# Patient Record
(demographics unavailable — no encounter records)

---

## 2024-11-25 NOTE — PHYSICAL EXAM
[Normal Heart Sounds] : normal heart sounds [2+] : left 2+ [Ankle Swelling Bilaterally] : bilaterally  [Ankle Swelling On The Right] : mild [Varicose Veins Of Lower Extremities] : bilaterally [Ankle Swelling On The Left] : moderate [JVD] : no jugular venous distention  [] : not present [Abdomen Masses] : No abdominal masses [Skin Ulcer] : no ulcer

## 2024-11-25 NOTE — ASSESSMENT
[FreeTextEntry1] : Patient complaining of right lower extremity discomfort.  No evidence of significant arterial insufficiency.  Patient has severe venous insufficiency involving the right lesser saphenous vein.  The vein is extremely tortuous and not a good candidate for endovenous ablation.  Patient will require stab phlebectomy of the varicosities and small saphenous vein.  This was all discussed with the patient.  At this point recommend compression stockings and elevation.  Follow-up as needed.

## 2025-04-29 NOTE — HISTORY OF PRESENT ILLNESS
[FreeTextEntry1] : PMH:  PSH:   Daily fluid intake: coffee x _ cup, decaf coffee x _ cup, water x _ cups, seltzer x _ cup

## 2025-04-29 NOTE — PROCEDURE
[FreeTextEntry1] :  A catheterized urine was performed to rule out urinary tract infection and/or retention. Post-void residual volume was ***

## 2025-04-29 NOTE — PHYSICAL EXAM
[FreeTextEntry1] : General: Well, appearing. Alert and orientated. No acute distress HEENT: Normocephalic, atraumatic and extraocular muscles appear to be intact Neck: Full range of motion, no obvious lymphadenopathy, deformities, or masses noted Respiratory: Speaking in full sentences comfortably, normal work of breathing and no cough during visit Musculoskeletal: active full range of motion in extremities Extremities: No upper extremity edema noted Skin: no obvious rash or skin lesions Neuro: Orientated X 3, speech is fluent, normal rate Psych: Normal mood and affect.

## 2025-06-04 NOTE — PHYSICAL EXAM
[Chaperoned Physical Exam] : A chaperone was present in the examining room during all aspects of the physical examination. [Labia Majora] : were normal [Labia Minora] : were normal [Normal Appearance] : general appearance was normal [Normal] : no abnormalities [FreeTextEntry1] : General: Well, appearing. Alert and orientated. No acute distress HEENT: Normocephalic, atraumatic and extraocular muscles appear to be intact Neck: Full range of motion, no obvious lymphadenopathy, deformities, or masses noted Respiratory: Speaking in full sentences comfortably, normal work of breathing and no cough during visit Musculoskeletal: active full range of motion in extremities Extremities: No upper extremity edema noted Skin: no obvious rash or skin lesions Neuro: Orientated X 3, speech is fluent, normal rate Psych: Normal mood and affect.     [Tenderness] : ~T no ~M abdominal tenderness observed [Distended] : not distended [de-identified] : no prolapse

## 2025-06-04 NOTE — HISTORY OF PRESENT ILLNESS
[Rectal Prolapse] : no [Unable To Restrain Bowel Movement] : moderate [Feelings Of Urinary Urgency] : no [x1] : nocturia once nightly [Urinary Tract Infection] : moderate [Stool Visible Blood] : no [Incomplete Emptying Of Stool] : no [de-identified] : sexually active [FreeTextEntry1] :  Tamara presents with recurrent symptomatic UTIs since 2022. She reports multiple UTIs per year and was started on post-coital ppx with nitro in 2022. She reports improvement however was then changed to Methenamine when she saw a urologist. She has been on methenamine and reports GI side effects. She also reports ~3 UTIs in 2025, no records available. She had one culture from January 2025 which was negative, however reports others positive. She is sexually active. She also reports bothersome urgency and UUI.   Daily fluid intake: 4 c coffee, 2 L water

## 2025-06-04 NOTE — REASON FOR VISIT
[Questionnaire Received] : Patient questionnaire received [Intake Form Reviewed] : Patient intake form with past medical history, surgical history, family history and social history reviewed today [Recurrent Urinary Infections] : recurrent urinary infections [Urinary Incontinence] : urinary incontinence [Urinary Urgency] : urinary urgency

## 2025-06-04 NOTE — PROCEDURE
[FreeTextEntry1] : A catheterized urine was performed to rule out urinary tract infection and/or retention. Post-void residual volume was 30cc

## 2025-06-04 NOTE — DISCUSSION/SUMMARY
[FreeTextEntry1] : rUTIs: -UA and culture today -Stop Methenamine -Start post-coital ppx with Nitrofurantoin 50 mg -Probiotic daily -TheraCran daily  Urgency, urgency incontinence: -Start behavioral and fluid modifications  -Bladder training -If no improvement in 2-3 mo, will start a trial of OAB medication  RTO in 3 mo for follow up, or sooner if issues arise.   The following treatment plan was designed for this patient and provided to her in written form and reviewed extensively. Patient was given a copy to take home:   For UTI Prevention:  1.	You have been given an Rx for an antibiotic. Take antibiotic within 30 minutes of having sexual intercourse 2.	Probiotic daily (ex Culturelle) 3.	TheraCran daily  If get UTI symptoms, take over the counter Cystex every 8 hours for symptoms. If symptoms continue, must get urine culture prior to starting treatment.   If get urine culture at outside facility, fax results to    For Urinary Symptoms (frequency, urgency, difficulty holding urine): **Total fluids: 34-50 oz (1-1.5 Liters) per day   -Ex. 8 oz coffee or tea (NOT BOTH!), 2-3 bottles of water (Each bottle is 16.9 oz). No flavoring added. No seltzer or Pelligrino!  -Drink slowly throughout day, no binge drinking (each bottle of water should take you hours, not minutes) **Avoid: 2nd cup coffee or tea (even if decaf), alcohol, carbonation (soda, seltzer), spicy foods, citrus, artificial sweeteners, chocolate **Stop eating and drinking 2-3 hours before bedtime (sips ok)  -Try the above fluid changes and bladder training. If symptoms do not improve in ~8 weeks, can start a trial of medication for overactive bladder

## 2025-07-25 NOTE — CONSULT LETTER
[Dear  ___] : Dear  [unfilled], [Consult Letter:] : I had the pleasure of evaluating your patient, [unfilled]. [Please see my note below.] : Please see my note below. [Consult Closing:] : Thank you very much for allowing me to participate in the care of this patient.  If you have any questions, please do not hesitate to contact me. [Sincerely,] : Sincerely, [FreeTextEntry3] : Cristina Wheat MD, FACS Director Thyroid and Parathyroid Surgery Assistant Professor of Surgery and Otolaryngology [facility]

## 2025-07-25 NOTE — ASSESSMENT
[FreeTextEntry1] : Patient with recently noted subcentimeter thyroid nodule.  I do not appreciate any suspicious findings on physical examination today.  No further follow-up of thyroid nodule is indicated at this time.  I have encouraged the patient to seek evaluation by her gastroenterologist for her complaints of reflux and other symptoms.  I have answered her questions to the best my ability.

## 2025-07-25 NOTE — HISTORY OF PRESENT ILLNESS
[de-identified] : Patient referred by Dr. Martinez for evaluation of thyroid nodule.  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  Neck ultrasound June 2025 revealed 3 mm benign-appearing left lower nodule.  No other nodules noted.  Patient complains of not feeling well with neck pain and discomfort with difficulty swallowing and reflux.  I have reviewed all old and new data and available images.  Additional information was obtained from others present at the time of the visit to ensure the completeness of the history.

## 2025-07-25 NOTE — PHYSICAL EXAM
[de-identified] : no cervical or supraclavicular adenopathy, trachea midline, thyroid without enlargement or palpable mass  [Normal] : no neck adenopathy [de-identified] : Skin:  normal appearance.  no rash, nodules, vesicles, or erythema, Musculoskeletal:  full range of motion and no deformities appreciated Neurological:  grossly intact Psychiatric:  oriented to person, place and time with appropriate affect